# Patient Record
Sex: FEMALE | Race: WHITE | URBAN - METROPOLITAN AREA
[De-identification: names, ages, dates, MRNs, and addresses within clinical notes are randomized per-mention and may not be internally consistent; named-entity substitution may affect disease eponyms.]

---

## 2022-06-30 ENCOUNTER — TELEPHONE (OUTPATIENT)
Dept: FAMILY MEDICINE CLINIC | Age: 77
End: 2022-06-30

## 2022-06-30 NOTE — TELEPHONE ENCOUNTER
----- Message from Ever Lara sent at 6/29/2022  5:06 PM EDT -----  Subject: Message to Provider    QUESTIONS  Information for Provider? Message 2/2 Pt is wondering if Dr. Joanna Finn would   be willing to sign off on the medication for her  so that he can   have it administered in [place]. They will be in town in a couple weeks, but   the pt's  will need his medication in a month. Call pt to advise.  ---------------------------------------------------------------------------  --------------  CALL BACK INFO  What is the best way for the office to contact you? OK to leave message on   voicemail  Preferred Call Back Phone Number? [phone]  ---------------------------------------------------------------------------  --------------  SCRIPT ANSWERS  Relationship to Patient?  Self Ashley with [employer] program states that there is not a script for the Pt at     [facility]     for Conture Next Strips     . She will check back with Pharmacy to make sure that Pt is able to get strips.    Thank You

## 2022-06-30 NOTE — TELEPHONE ENCOUNTER
----- Message from Audra Atwood sent at 6/29/2022  5:02 PM EDT -----  Subject: Message to Provider    QUESTIONS  Information for Provider? Message 1/2 Pt called in regards to her    Cindy Reach 1945. He has [facility] Medicare Pt stated her  takes   IBIG and it is administered by an [facility] RN by NABOR Helga Fiore was prescribed   IBIG by Leanne Francis a Neurologist in [facility], he can be reached by phone   [phone] at or fax [phone]. Pt stated she and her  have a   house in [place], and [facility] is willing to to administer IBIG in [place], but they   require a physician licensed in [place] to sign off on the prescription.  ---------------------------------------------------------------------------  --------------  [address]  What is the best way for the office to contact you? OK to leave message on   voicemail  Preferred Call Back Phone Number? [phone]  ---------------------------------------------------------------------------  --------------  SCRIPT ANSWERS  Relationship to Patient?  Self

## 2022-06-30 NOTE — TELEPHONE ENCOUNTER
Tried calling patient to clarify message. No answer and not able to leave vm. I would feel like patient would need to establish first before any Dr can sign off on any medication.  Also Dr. Christa Zayas is not accepting NP.

## 2022-07-08 NOTE — TELEPHONE ENCOUNTER
This message is being handled by - [address] [facility] [address]     Please contact contact for questions or information. No chart found for spouse Cindy Reach 1945    [facility] informing that spouse will have to establish care in order for one of our providers to be able to sign on any order. If Denise Diaz returns call we may schedule appropriately as a new patient with a provider that is accepting new patients. If able to verify an existing chart for spouse with [facility] PCP we may be able to assist with communicating needs for scheduling within timeframe requested. Otherwise a new chart will need to be created, patient scheduled as able, and this encounter in its entirety to be copied and a new encounter for the correct patient.